# Patient Record
Sex: MALE | Race: WHITE | NOT HISPANIC OR LATINO | Employment: FULL TIME
[De-identification: names, ages, dates, MRNs, and addresses within clinical notes are randomized per-mention and may not be internally consistent; named-entity substitution may affect disease eponyms.]

---

## 2017-03-24 LAB — COLONOSCOPY STUDY: NORMAL

## 2018-01-19 ENCOUNTER — HOSPITAL (OUTPATIENT)
Dept: OTHER | Age: 53
End: 2018-01-19

## 2018-01-19 ENCOUNTER — IMAGING SERVICES (OUTPATIENT)
Dept: OTHER | Age: 53
End: 2018-01-19

## 2019-01-21 ENCOUNTER — TELEPHONE (OUTPATIENT)
Dept: SCHEDULING | Age: 54
End: 2019-01-21

## 2019-03-14 ENCOUNTER — APPOINTMENT (OUTPATIENT)
Dept: DERMATOLOGY | Age: 54
End: 2019-03-14

## 2019-07-12 ENCOUNTER — TELEPHONE (OUTPATIENT)
Dept: SCHEDULING | Age: 54
End: 2019-07-12

## 2019-07-15 ENCOUNTER — OFFICE VISIT (OUTPATIENT)
Dept: FAMILY MEDICINE | Age: 54
End: 2019-07-15

## 2019-07-15 VITALS
TEMPERATURE: 98.2 F | OXYGEN SATURATION: 99 % | BODY MASS INDEX: 29.54 KG/M2 | HEART RATE: 77 BPM | DIASTOLIC BLOOD PRESSURE: 84 MMHG | WEIGHT: 211 LBS | SYSTOLIC BLOOD PRESSURE: 122 MMHG | RESPIRATION RATE: 16 BRPM | HEIGHT: 71 IN

## 2019-07-15 DIAGNOSIS — J30.2 SEASONAL ALLERGIC RHINITIS, UNSPECIFIED TRIGGER: ICD-10-CM

## 2019-07-15 DIAGNOSIS — Z00.00 ANNUAL PHYSICAL EXAM: Primary | ICD-10-CM

## 2019-07-15 PROCEDURE — 99386 PREV VISIT NEW AGE 40-64: CPT | Performed by: FAMILY MEDICINE

## 2019-07-15 RX ORDER — OMEPRAZOLE 40 MG/1
40 CAPSULE, DELAYED RELEASE ORAL DAILY
COMMUNITY
End: 2020-11-12 | Stop reason: ALTCHOICE

## 2019-07-15 RX ORDER — FLUTICASONE PROPIONATE 50 MCG
2 SPRAY, SUSPENSION (ML) NASAL DAILY
Qty: 16 G | Refills: 5 | Status: SHIPPED | OUTPATIENT
Start: 2019-07-15 | End: 2019-08-01 | Stop reason: ALTCHOICE

## 2019-07-15 RX ORDER — METHYLPREDNISOLONE 4 MG
4 TABLET, DOSE PACK ORAL SEE ADMIN INSTRUCTIONS
COMMUNITY
End: 2019-08-01 | Stop reason: ALTCHOICE

## 2019-07-15 ASSESSMENT — ENCOUNTER SYMPTOMS
TROUBLE SWALLOWING: 0
CONSTIPATION: 0
BRUISES/BLEEDS EASILY: 0
HEADACHES: 0
DIZZINESS: 0
SORE THROAT: 0
POLYPHAGIA: 0
DIARRHEA: 0
FATIGUE: 0
NUMBNESS: 0
EYE PAIN: 0
POLYDIPSIA: 0
COUGH: 0
ABDOMINAL PAIN: 0
SHORTNESS OF BREATH: 0

## 2019-07-15 ASSESSMENT — PATIENT HEALTH QUESTIONNAIRE - PHQ9
SUM OF ALL RESPONSES TO PHQ9 QUESTIONS 1 AND 2: 0
1. LITTLE INTEREST OR PLEASURE IN DOING THINGS: NOT AT ALL
2. FEELING DOWN, DEPRESSED OR HOPELESS: NOT AT ALL
SUM OF ALL RESPONSES TO PHQ9 QUESTIONS 1 AND 2: 0

## 2019-07-18 ENCOUNTER — LAB SERVICES (OUTPATIENT)
Dept: LAB | Age: 54
End: 2019-07-18

## 2019-07-18 DIAGNOSIS — Z00.00 ANNUAL PHYSICAL EXAM: ICD-10-CM

## 2019-07-18 LAB
ALBUMIN SERPL-MCNC: 4 G/DL (ref 3.6–5.1)
ALBUMIN/GLOB SERPL: 1.4 {RATIO} (ref 1–2.4)
ALP SERPL-CCNC: 57 UNITS/L (ref 45–117)
ALT SERPL-CCNC: 58 UNITS/L
ANION GAP SERPL CALC-SCNC: 12 MMOL/L (ref 10–20)
AST SERPL-CCNC: 24 UNITS/L
BASOPHILS # BLD AUTO: 0.1 K/MCL (ref 0–0.3)
BASOPHILS NFR BLD AUTO: 1 %
BILIRUB SERPL-MCNC: 0.9 MG/DL (ref 0.2–1)
BUN SERPL-MCNC: 16 MG/DL (ref 6–20)
BUN/CREAT SERPL: 19 (ref 7–25)
CALCIUM SERPL-MCNC: 9.2 MG/DL (ref 8.4–10.2)
CHLORIDE SERPL-SCNC: 105 MMOL/L (ref 98–107)
CHOLEST SERPL-MCNC: 171 MG/DL
CHOLEST/HDLC SERPL: 2.3 {RATIO}
CO2 SERPL-SCNC: 27 MMOL/L (ref 21–32)
CREAT SERPL-MCNC: 0.82 MG/DL (ref 0.67–1.17)
DIFFERENTIAL METHOD BLD: NORMAL
EOSINOPHIL # BLD AUTO: 0.4 K/MCL (ref 0.1–0.5)
EOSINOPHIL NFR SPEC: 5 %
ERYTHROCYTE [DISTWIDTH] IN BLOOD: 12.6 % (ref 11–15)
FASTING STATUS PATIENT QL REPORTED: 12 HRS
GLOBULIN SER-MCNC: 2.8 G/DL (ref 2–4)
GLUCOSE SERPL-MCNC: 85 MG/DL (ref 65–99)
HCT VFR BLD CALC: 44.5 % (ref 39–51)
HDLC SERPL-MCNC: 75 MG/DL
HGB BLD-MCNC: 14.7 G/DL (ref 13–17)
IMM GRANULOCYTES # BLD AUTO: 0 K/MCL (ref 0–0.2)
IMM GRANULOCYTES NFR BLD: 0 %
LDLC SERPL-MCNC: 85 MG/DL
LENGTH OF FAST TIME PATIENT: 12 HRS
LYMPHOCYTES # BLD MANUAL: 2.9 K/MCL (ref 1–4)
LYMPHOCYTES NFR BLD MANUAL: 38 %
MCH RBC QN AUTO: 31.3 PG (ref 26–34)
MCHC RBC AUTO-ENTMCNC: 33 G/DL (ref 32–36.5)
MCV RBC AUTO: 94.9 FL (ref 78–100)
MONOCYTES # BLD MANUAL: 0.9 K/MCL (ref 0.3–0.9)
MONOCYTES NFR BLD MANUAL: 12 %
NEUTROPHILS # BLD: 3.4 K/MCL (ref 1.8–7.7)
NEUTROPHILS NFR BLD AUTO: 44 %
NONHDLC SERPL-MCNC: 96 MG/DL
NRBC BLD MANUAL-RTO: 0 /100 WBC
PLATELET # BLD: 236 K/MCL (ref 140–450)
POTASSIUM SERPL-SCNC: 3.5 MMOL/L (ref 3.4–5.1)
PROT SERPL-MCNC: 6.8 G/DL (ref 6.4–8.2)
PSA SERPL-MCNC: 0.94 NG/ML
RBC # BLD: 4.69 MIL/MCL (ref 4.5–5.9)
SODIUM SERPL-SCNC: 141 MMOL/L (ref 135–145)
TRIGL SERPL-MCNC: 56 MG/DL
TSH SERPL-ACNC: 2.44 MCUNITS/ML (ref 0.35–5)
WBC # BLD: 7.7 K/MCL (ref 4.2–11)

## 2019-07-18 PROCEDURE — 85025 COMPLETE CBC W/AUTO DIFF WBC: CPT | Performed by: FAMILY MEDICINE

## 2019-07-18 PROCEDURE — G0103 PSA SCREENING: HCPCS | Performed by: FAMILY MEDICINE

## 2019-07-18 PROCEDURE — 84443 ASSAY THYROID STIM HORMONE: CPT | Performed by: FAMILY MEDICINE

## 2019-07-18 PROCEDURE — 80053 COMPREHEN METABOLIC PANEL: CPT | Performed by: FAMILY MEDICINE

## 2019-07-18 PROCEDURE — 80061 LIPID PANEL: CPT | Performed by: FAMILY MEDICINE

## 2019-07-18 PROCEDURE — 36415 COLL VENOUS BLD VENIPUNCTURE: CPT

## 2019-07-19 ENCOUNTER — E-ADVICE (OUTPATIENT)
Dept: FAMILY MEDICINE | Age: 54
End: 2019-07-19

## 2019-07-25 ENCOUNTER — TELEPHONE (OUTPATIENT)
Dept: SCHEDULING | Age: 54
End: 2019-07-25

## 2019-07-25 ENCOUNTER — E-ADVICE (OUTPATIENT)
Dept: FAMILY MEDICINE | Age: 54
End: 2019-07-25

## 2019-07-26 ENCOUNTER — TELEPHONE (OUTPATIENT)
Dept: SCHEDULING | Age: 54
End: 2019-07-26

## 2019-07-30 ENCOUNTER — TELEPHONE (OUTPATIENT)
Dept: SCHEDULING | Age: 54
End: 2019-07-30

## 2019-08-01 ENCOUNTER — OFFICE VISIT (OUTPATIENT)
Dept: FAMILY MEDICINE | Age: 54
End: 2019-08-01

## 2019-08-01 VITALS
HEART RATE: 86 BPM | TEMPERATURE: 98.1 F | HEIGHT: 71 IN | SYSTOLIC BLOOD PRESSURE: 102 MMHG | OXYGEN SATURATION: 95 % | WEIGHT: 211.5 LBS | BODY MASS INDEX: 29.61 KG/M2 | DIASTOLIC BLOOD PRESSURE: 70 MMHG | RESPIRATION RATE: 16 BRPM

## 2019-08-01 DIAGNOSIS — J04.0 ACUTE LARYNGITIS: Primary | ICD-10-CM

## 2019-08-01 DIAGNOSIS — R49.0 HOARSENESS OF VOICE: ICD-10-CM

## 2019-08-01 DIAGNOSIS — G47.9 SLEEP DIFFICULTIES: ICD-10-CM

## 2019-08-01 PROCEDURE — 99214 OFFICE O/P EST MOD 30 MIN: CPT | Performed by: FAMILY MEDICINE

## 2019-08-01 RX ORDER — AZITHROMYCIN 250 MG/1
TABLET, FILM COATED ORAL
Qty: 6 TABLET | Refills: 0 | Status: SHIPPED | OUTPATIENT
Start: 2019-08-01 | End: 2020-11-12 | Stop reason: ALTCHOICE

## 2019-08-01 ASSESSMENT — ENCOUNTER SYMPTOMS
EYE PAIN: 0
SORE THROAT: 1
VOICE CHANGE: 1
FATIGUE: 0
HEADACHES: 0
ABDOMINAL PAIN: 0
COUGH: 0
BRUISES/BLEEDS EASILY: 0
CONSTIPATION: 0
DIARRHEA: 0
SHORTNESS OF BREATH: 0
TROUBLE SWALLOWING: 0
DIZZINESS: 0

## 2019-08-01 ASSESSMENT — PATIENT HEALTH QUESTIONNAIRE - PHQ9
1. LITTLE INTEREST OR PLEASURE IN DOING THINGS: NOT AT ALL
SUM OF ALL RESPONSES TO PHQ9 QUESTIONS 1 AND 2: 0
SUM OF ALL RESPONSES TO PHQ9 QUESTIONS 1 AND 2: 0
2. FEELING DOWN, DEPRESSED OR HOPELESS: NOT AT ALL

## 2019-08-02 ENCOUNTER — E-ADVICE (OUTPATIENT)
Dept: FAMILY MEDICINE | Age: 54
End: 2019-08-02

## 2019-08-03 ENCOUNTER — APPOINTMENT (OUTPATIENT)
Dept: FAMILY MEDICINE | Age: 54
End: 2019-08-03

## 2019-08-05 ENCOUNTER — APPOINTMENT (OUTPATIENT)
Dept: FAMILY MEDICINE | Age: 54
End: 2019-08-05

## 2019-08-23 ENCOUNTER — TELEPHONE (OUTPATIENT)
Dept: SCHEDULING | Age: 54
End: 2019-08-23

## 2019-08-23 ENCOUNTER — EXTERNAL RECORD (OUTPATIENT)
Dept: HEALTH INFORMATION MANAGEMENT | Facility: OTHER | Age: 54
End: 2019-08-23

## 2019-08-26 ENCOUNTER — E-ADVICE (OUTPATIENT)
Dept: FAMILY MEDICINE | Age: 54
End: 2019-08-26

## 2019-08-27 ENCOUNTER — HOSPITAL (OUTPATIENT)
Dept: OTHER | Age: 54
End: 2019-08-27
Attending: OTOLARYNGOLOGY

## 2019-08-29 LAB
ALLERGEN CORN IGE (CRN): <0.35
ALLERGEN CORN IGE (CRN): NORMAL
ALLERGEN CORN IGE (CRN): NORMAL
ALLERGEN COW'S MILK IGE (MLK): <0.35
ALLERGEN COW'S MILK IGE (MLK): NORMAL
ALLERGEN COW'S MILK IGE (MLK): NORMAL
ALLERGEN CRAB IGE (CRB): <0.35
ALLERGEN CRAB IGE (CRB): NORMAL
ALLERGEN CRAB IGE (CRB): NORMAL
ALLERGEN EGG WHITE IGE (EGGW): <0.35
ALLERGEN EGG WHITE IGE (EGGW): NORMAL
ALLERGEN EGG WHITE IGE (EGGW): NORMAL
ALLERGEN ORANGE IGE (ORAN): <0.35
ALLERGEN ORANGE IGE (ORAN): NORMAL
ALLERGEN ORANGE IGE (ORAN): NORMAL
ALLERGEN PEANUT IGE (PNUT): <0.35
ALLERGEN PEANUT IGE (PNUT): NORMAL
ALLERGEN PEANUT IGE (PNUT): NORMAL
ALLERGEN SHRIMP IGE (SRMP): <0.35
ALLERGEN SHRIMP IGE (SRMP): NORMAL
ALLERGEN SHRIMP IGE (SRMP): NORMAL
ALLERGEN SOYBEAN IGE (SOY): <0.35
ALLERGEN SOYBEAN IGE (SOY): NORMAL
ALLERGEN SOYBEAN IGE (SOY): NORMAL
ALLERGEN TUNA IGE (TUN): <0.35
ALLERGEN TUNA IGE (TUN): NORMAL
ALLERGEN TUNA IGE (TUN): NORMAL
ALLERGEN WHEAT IGE (WEAT): <0.35
ALLERGEN WHEAT IGE (WEAT): NORMAL
ALLERGEN WHEAT IGE (WEAT): NORMAL
CORN CLASS (CRNCL): 0
CORN CLASS (CRNCL): NORMAL
COW'S MILK CLASS (MLKCL): 0
COW'S MILK CLASS (MLKCL): NORMAL
CRAB CLASS (CRBCL): 0
CRAB CLASS (CRBCL): NORMAL
EGG WHITE CLASS (EGGCL): 0
EGG WHITE CLASS (EGGCL): NORMAL
ORANGE CLASS (ORNCL): 0
ORANGE CLASS (ORNCL): NORMAL
PEANUT CLASS (PNUCL): 0
PEANUT CLASS (PNUCL): NORMAL
SHRIMP CLASS (SRMCL): 0
SHRIMP CLASS (SRMCL): NORMAL
SOYBEAN CLASS (SOYCL): 0
SOYBEAN CLASS (SOYCL): NORMAL
TUNA CLASS (TUNCL): 0
TUNA CLASS (TUNCL): NORMAL
WHEAT CLASS (WEACL): 0
WHEAT CLASS (WEACL): NORMAL

## 2019-09-05 ENCOUNTER — EXTERNAL RECORD (OUTPATIENT)
Dept: HEALTH INFORMATION MANAGEMENT | Facility: OTHER | Age: 54
End: 2019-09-05

## 2019-09-27 ENCOUNTER — EXTERNAL RECORD (OUTPATIENT)
Dept: HEALTH INFORMATION MANAGEMENT | Facility: OTHER | Age: 54
End: 2019-09-27

## 2019-12-10 ENCOUNTER — EXTERNAL RECORD (OUTPATIENT)
Dept: HEALTH INFORMATION MANAGEMENT | Facility: OTHER | Age: 54
End: 2019-12-10

## 2019-12-15 ENCOUNTER — OFFICE VISIT (OUTPATIENT)
Dept: SLEEP MEDICINE | Age: 54
End: 2019-12-15

## 2019-12-15 DIAGNOSIS — G47.33 OSA (OBSTRUCTIVE SLEEP APNEA): ICD-10-CM

## 2019-12-15 PROCEDURE — 95810 POLYSOM 6/> YRS 4/> PARAM: CPT | Performed by: INTERNAL MEDICINE

## 2020-01-01 ENCOUNTER — EXTERNAL RECORD (OUTPATIENT)
Dept: HEALTH INFORMATION MANAGEMENT | Facility: OTHER | Age: 55
End: 2020-01-01

## 2020-01-24 ENCOUNTER — TELEPHONE (OUTPATIENT)
Dept: SCHEDULING | Age: 55
End: 2020-01-24

## 2020-01-30 ENCOUNTER — LAB SERVICES (OUTPATIENT)
Dept: LAB | Age: 55
End: 2020-01-30

## 2020-01-30 DIAGNOSIS — E61.1 IRON DEFICIENCY: Primary | ICD-10-CM

## 2020-01-30 PROCEDURE — 82607 VITAMIN B-12: CPT | Performed by: FAMILY MEDICINE

## 2020-01-30 PROCEDURE — 36415 COLL VENOUS BLD VENIPUNCTURE: CPT | Performed by: FAMILY MEDICINE

## 2020-01-30 PROCEDURE — 82728 ASSAY OF FERRITIN: CPT | Performed by: FAMILY MEDICINE

## 2020-01-30 PROCEDURE — 82747 ASSAY OF FOLIC ACID RBC: CPT | Performed by: FAMILY MEDICINE

## 2020-01-31 LAB
FERRITIN SERPL-MCNC: 75 NG/ML (ref 26–388)
FOLATE RBC-MCNC: 630 NG/ML (ref 280–791)
VIT B12 SERPL-MCNC: 753 PG/ML (ref 211–911)

## 2020-02-05 ENCOUNTER — TELEPHONE (OUTPATIENT)
Dept: SCHEDULING | Age: 55
End: 2020-02-05

## 2020-02-05 ENCOUNTER — EXTERNAL RECORD (OUTPATIENT)
Dept: HEALTH INFORMATION MANAGEMENT | Facility: OTHER | Age: 55
End: 2020-02-05

## 2020-03-16 ENCOUNTER — E-ADVICE (OUTPATIENT)
Dept: FAMILY MEDICINE | Age: 55
End: 2020-03-16

## 2020-11-12 ENCOUNTER — TELEPHONE (OUTPATIENT)
Dept: SCHEDULING | Age: 55
End: 2020-11-12

## 2020-11-12 ENCOUNTER — HOSPITAL ENCOUNTER (OUTPATIENT)
Dept: CT IMAGING | Age: 55
Discharge: HOME | End: 2020-11-12
Attending: FAMILY MEDICINE

## 2020-11-12 ENCOUNTER — V-VISIT (OUTPATIENT)
Dept: FAMILY MEDICINE | Age: 55
End: 2020-11-12

## 2020-11-12 DIAGNOSIS — U07.1 COVID-19 VIRUS INFECTION: Primary | ICD-10-CM

## 2020-11-12 DIAGNOSIS — U07.1 COVID-19 VIRUS INFECTION: ICD-10-CM

## 2020-11-12 DIAGNOSIS — R05.3 PERSISTENT COUGH: ICD-10-CM

## 2020-11-12 PROBLEM — R49.0 HOARSENESS OF VOICE: Status: RESOLVED | Noted: 2019-08-01 | Resolved: 2020-11-12

## 2020-11-12 PROBLEM — J04.0 ACUTE LARYNGITIS: Status: RESOLVED | Noted: 2019-08-01 | Resolved: 2020-11-12

## 2020-11-12 PROBLEM — G47.9 SLEEP DIFFICULTIES: Status: RESOLVED | Noted: 2019-08-01 | Resolved: 2020-11-12

## 2020-11-12 PROCEDURE — 99214 OFFICE O/P EST MOD 30 MIN: CPT | Performed by: FAMILY MEDICINE

## 2020-11-12 PROCEDURE — 71250 CT THORAX DX C-: CPT

## 2020-11-12 RX ORDER — DEXTROMETHORPHAN HYDROBROMIDE AND PROMETHAZINE HYDROCHLORIDE 15; 6.25 MG/5ML; MG/5ML
5 SYRUP ORAL EVERY 4 HOURS PRN
Qty: 473 ML | Refills: 0 | Status: SHIPPED | OUTPATIENT
Start: 2020-11-12 | End: 2020-12-05

## 2020-11-12 RX ORDER — ALBUTEROL SULFATE 90 UG/1
1-2 AEROSOL, METERED RESPIRATORY (INHALATION) EVERY 6 HOURS PRN
COMMUNITY
Start: 2020-11-07 | End: 2021-01-07 | Stop reason: ALTCHOICE

## 2020-11-12 RX ORDER — DEXTROMETHORPHAN HYDROBROMIDE AND PROMETHAZINE HYDROCHLORIDE 15; 6.25 MG/5ML; MG/5ML
5 SYRUP ORAL EVERY 4 HOURS PRN
COMMUNITY
Start: 2020-11-07 | End: 2020-11-12 | Stop reason: SDUPTHER

## 2020-11-12 RX ORDER — PREDNISONE 20 MG/1
40 TABLET ORAL DAILY
Qty: 10 TABLET | Refills: 0 | Status: SHIPPED | OUTPATIENT
Start: 2020-11-12 | End: 2020-11-25 | Stop reason: ALTCHOICE

## 2020-11-12 ASSESSMENT — ENCOUNTER SYMPTOMS
DIARRHEA: 0
SHORTNESS OF BREATH: 0
ABDOMINAL PAIN: 0
COUGH: 1
CONSTIPATION: 0
WHEEZING: 0
FATIGUE: 1
EYE PAIN: 0
TROUBLE SWALLOWING: 0
SORE THROAT: 0
FEVER: 1
CHILLS: 1

## 2020-11-12 ASSESSMENT — PATIENT HEALTH QUESTIONNAIRE - PHQ9
CLINICAL INTERPRETATION OF PHQ9 SCORE: NO FURTHER SCREENING NEEDED
SUM OF ALL RESPONSES TO PHQ9 QUESTIONS 1 AND 2: 0
SUM OF ALL RESPONSES TO PHQ9 QUESTIONS 1 AND 2: 0
CLINICAL INTERPRETATION OF PHQ2 SCORE: NO FURTHER SCREENING NEEDED
2. FEELING DOWN, DEPRESSED OR HOPELESS: NOT AT ALL
1. LITTLE INTEREST OR PLEASURE IN DOING THINGS: NOT AT ALL

## 2020-11-16 ENCOUNTER — OFFICE VISIT (OUTPATIENT)
Dept: FAMILY MEDICINE | Age: 55
End: 2020-11-16

## 2020-11-16 DIAGNOSIS — U07.1 COVID-19 VIRUS INFECTION: Primary | ICD-10-CM

## 2020-11-16 PROCEDURE — 99213 OFFICE O/P EST LOW 20 MIN: CPT | Performed by: FAMILY MEDICINE

## 2020-11-16 ASSESSMENT — ENCOUNTER SYMPTOMS
CHILLS: 0
COUGH: 1
FATIGUE: 1
SORE THROAT: 0
FEVER: 0
DIARRHEA: 0
CONSTIPATION: 0
EYE PAIN: 0
ABDOMINAL PAIN: 0
SHORTNESS OF BREATH: 0
TROUBLE SWALLOWING: 0

## 2020-11-16 ASSESSMENT — PATIENT HEALTH QUESTIONNAIRE - PHQ9
1. LITTLE INTEREST OR PLEASURE IN DOING THINGS: NOT AT ALL
2. FEELING DOWN, DEPRESSED OR HOPELESS: NOT AT ALL
CLINICAL INTERPRETATION OF PHQ9 SCORE: NO FURTHER SCREENING NEEDED
CLINICAL INTERPRETATION OF PHQ2 SCORE: NO FURTHER SCREENING NEEDED
SUM OF ALL RESPONSES TO PHQ9 QUESTIONS 1 AND 2: 0
SUM OF ALL RESPONSES TO PHQ9 QUESTIONS 1 AND 2: 0

## 2020-11-17 ENCOUNTER — TELEPHONE (OUTPATIENT)
Dept: SCHEDULING | Age: 55
End: 2020-11-17

## 2020-11-20 ENCOUNTER — TELEPHONE (OUTPATIENT)
Dept: SCHEDULING | Age: 55
End: 2020-11-20

## 2020-11-24 ENCOUNTER — TELEPHONE (OUTPATIENT)
Dept: SCHEDULING | Age: 55
End: 2020-11-24

## 2020-11-25 ENCOUNTER — V-VISIT (OUTPATIENT)
Dept: FAMILY MEDICINE | Age: 55
End: 2020-11-25

## 2020-11-25 ENCOUNTER — TELEPHONE (OUTPATIENT)
Dept: SCHEDULING | Age: 55
End: 2020-11-25

## 2020-11-25 DIAGNOSIS — R05.3 PERSISTENT COUGH: ICD-10-CM

## 2020-11-25 DIAGNOSIS — U07.1 COVID-19 VIRUS INFECTION: Primary | ICD-10-CM

## 2020-11-25 PROCEDURE — 99213 OFFICE O/P EST LOW 20 MIN: CPT | Performed by: FAMILY MEDICINE

## 2020-11-25 RX ORDER — FLUTICASONE FUROATE AND VILANTEROL 200; 25 UG/1; UG/1
1 POWDER RESPIRATORY (INHALATION) DAILY
Qty: 60 EACH | Refills: 1 | Status: SHIPPED | OUTPATIENT
Start: 2020-11-25 | End: 2021-01-07 | Stop reason: ALTCHOICE

## 2020-11-25 ASSESSMENT — PATIENT HEALTH QUESTIONNAIRE - PHQ9
CLINICAL INTERPRETATION OF PHQ2 SCORE: NO FURTHER SCREENING NEEDED
SUM OF ALL RESPONSES TO PHQ9 QUESTIONS 1 AND 2: 0
CLINICAL INTERPRETATION OF PHQ9 SCORE: NO FURTHER SCREENING NEEDED
SUM OF ALL RESPONSES TO PHQ9 QUESTIONS 1 AND 2: 0
2. FEELING DOWN, DEPRESSED OR HOPELESS: NOT AT ALL
1. LITTLE INTEREST OR PLEASURE IN DOING THINGS: NOT AT ALL

## 2020-11-25 ASSESSMENT — ENCOUNTER SYMPTOMS
SHORTNESS OF BREATH: 0
ABDOMINAL PAIN: 0
DIARRHEA: 0
COUGH: 1
CONSTIPATION: 0
FEVER: 0
SORE THROAT: 0
TROUBLE SWALLOWING: 0
CHILLS: 0
FATIGUE: 1
EYE PAIN: 0

## 2020-12-04 DIAGNOSIS — R05.3 PERSISTENT COUGH: ICD-10-CM

## 2020-12-04 DIAGNOSIS — U07.1 COVID-19 VIRUS INFECTION: ICD-10-CM

## 2020-12-05 RX ORDER — DEXTROMETHORPHAN HYDROBROMIDE AND PROMETHAZINE HYDROCHLORIDE 15; 6.25 MG/5ML; MG/5ML
SYRUP ORAL
Qty: 240 ML | Refills: 1 | Status: SHIPPED | OUTPATIENT
Start: 2020-12-05 | End: 2021-01-07 | Stop reason: ALTCHOICE

## 2020-12-22 ENCOUNTER — TELEPHONE (OUTPATIENT)
Dept: SCHEDULING | Age: 55
End: 2020-12-22

## 2020-12-23 ENCOUNTER — TELEPHONE (OUTPATIENT)
Dept: SCHEDULING | Age: 55
End: 2020-12-23

## 2020-12-28 ENCOUNTER — APPOINTMENT (OUTPATIENT)
Dept: FAMILY MEDICINE | Age: 55
End: 2020-12-28

## 2021-01-01 ENCOUNTER — EXTERNAL RECORD (OUTPATIENT)
Dept: OTHER | Age: 56
End: 2021-01-01

## 2021-01-05 ENCOUNTER — TELEPHONE (OUTPATIENT)
Dept: SCHEDULING | Age: 56
End: 2021-01-05

## 2021-01-07 ENCOUNTER — OFFICE VISIT (OUTPATIENT)
Dept: FAMILY MEDICINE | Age: 56
End: 2021-01-07

## 2021-01-07 DIAGNOSIS — U07.1 COVID-19 VIRUS INFECTION: ICD-10-CM

## 2021-01-07 DIAGNOSIS — Z11.59 NEED FOR HEPATITIS C SCREENING TEST: ICD-10-CM

## 2021-01-07 DIAGNOSIS — Z00.00 ANNUAL PHYSICAL EXAM: Primary | ICD-10-CM

## 2021-01-07 DIAGNOSIS — R05.3 PERSISTENT COUGH: ICD-10-CM

## 2021-01-07 LAB
BASOPHILS # BLD: 0.1 K/MCL (ref 0–0.3)
BASOPHILS NFR BLD: 2 %
DEPRECATED RDW RBC: 45 FL (ref 39–50)
EOSINOPHIL # BLD: 0.4 K/MCL (ref 0–0.5)
EOSINOPHIL NFR BLD: 5 %
ERYTHROCYTE [DISTWIDTH] IN BLOOD: 12.6 % (ref 11–15)
HCT VFR BLD CALC: 46.8 % (ref 39–51)
HGB BLD-MCNC: 15.2 G/DL (ref 13–17)
IMM GRANULOCYTES # BLD AUTO: 0 K/MCL (ref 0–0.2)
IMM GRANULOCYTES # BLD: 0 %
LYMPHOCYTES # BLD: 1.8 K/MCL (ref 1–4)
LYMPHOCYTES NFR BLD: 27 %
MCH RBC QN AUTO: 31 PG (ref 26–34)
MCHC RBC AUTO-ENTMCNC: 32.5 G/DL (ref 32–36.5)
MCV RBC AUTO: 95.5 FL (ref 78–100)
MONOCYTES # BLD: 0.7 K/MCL (ref 0.3–0.9)
MONOCYTES NFR BLD: 10 %
NEUTROPHILS # BLD: 3.7 K/MCL (ref 1.8–7.7)
NEUTROPHILS NFR BLD: 56 %
NRBC BLD MANUAL-RTO: 0 /100 WBC
PLATELET # BLD AUTO: 259 K/MCL (ref 140–450)
RBC # BLD: 4.9 MIL/MCL (ref 4.5–5.9)
WBC # BLD: 6.5 K/MCL (ref 4.2–11)

## 2021-01-07 PROCEDURE — 86901 BLOOD TYPING SEROLOGIC RH(D): CPT | Performed by: FAMILY MEDICINE

## 2021-01-07 PROCEDURE — 86769 SARS-COV-2 COVID-19 ANTIBODY: CPT | Performed by: FAMILY MEDICINE

## 2021-01-07 PROCEDURE — 36415 COLL VENOUS BLD VENIPUNCTURE: CPT | Performed by: FAMILY MEDICINE

## 2021-01-07 PROCEDURE — 80050 GENERAL HEALTH PANEL: CPT | Performed by: FAMILY MEDICINE

## 2021-01-07 PROCEDURE — 86900 BLOOD TYPING SEROLOGIC ABO: CPT | Performed by: FAMILY MEDICINE

## 2021-01-07 PROCEDURE — 80061 LIPID PANEL: CPT | Performed by: FAMILY MEDICINE

## 2021-01-07 PROCEDURE — 86803 HEPATITIS C AB TEST: CPT | Performed by: FAMILY MEDICINE

## 2021-01-07 PROCEDURE — 99396 PREV VISIT EST AGE 40-64: CPT | Performed by: FAMILY MEDICINE

## 2021-01-07 RX ORDER — FLUTICASONE FUROATE AND VILANTEROL 200; 25 UG/1; UG/1
1 POWDER RESPIRATORY (INHALATION) DAILY
Qty: 60 EACH | Refills: 2 | Status: SHIPPED | OUTPATIENT
Start: 2021-01-07 | End: 2021-04-27 | Stop reason: ALTCHOICE

## 2021-01-07 RX ORDER — OMEPRAZOLE 40 MG/1
CAPSULE, DELAYED RELEASE ORAL
COMMUNITY
Start: 2020-12-09 | End: 2023-06-19

## 2021-01-07 RX ORDER — FEXOFENADINE HCL 180 MG/1
180 TABLET ORAL DAILY
COMMUNITY
End: 2021-04-27 | Stop reason: ALTCHOICE

## 2021-01-07 ASSESSMENT — PATIENT HEALTH QUESTIONNAIRE - PHQ9
CLINICAL INTERPRETATION OF PHQ9 SCORE: NO FURTHER SCREENING NEEDED
2. FEELING DOWN, DEPRESSED OR HOPELESS: NOT AT ALL
CLINICAL INTERPRETATION OF PHQ2 SCORE: NO FURTHER SCREENING NEEDED
1. LITTLE INTEREST OR PLEASURE IN DOING THINGS: NOT AT ALL
SUM OF ALL RESPONSES TO PHQ9 QUESTIONS 1 AND 2: 0
SUM OF ALL RESPONSES TO PHQ9 QUESTIONS 1 AND 2: 0

## 2021-01-07 ASSESSMENT — ENCOUNTER SYMPTOMS
HEADACHES: 0
SORE THROAT: 0
DIZZINESS: 0
DIARRHEA: 0
TROUBLE SWALLOWING: 0
COUGH: 0
EYE PAIN: 0
CONSTIPATION: 0
FATIGUE: 0
SHORTNESS OF BREATH: 0
ABDOMINAL PAIN: 0

## 2021-01-07 ASSESSMENT — PAIN SCALES - GENERAL: PAINLEVEL: 0

## 2021-01-08 LAB
ABO + RH BLD: NORMAL
ALBUMIN SERPL-MCNC: 4.3 G/DL (ref 3.6–5.1)
ALBUMIN/GLOB SERPL: 1.2 {RATIO} (ref 1–2.4)
ALP SERPL-CCNC: 58 UNITS/L (ref 45–117)
ALT SERPL-CCNC: 76 UNITS/L
ANION GAP SERPL CALC-SCNC: 12 MMOL/L (ref 10–20)
AST SERPL-CCNC: 40 UNITS/L
BILIRUB SERPL-MCNC: 0.8 MG/DL (ref 0.2–1)
BUN SERPL-MCNC: 14 MG/DL (ref 6–20)
BUN/CREAT SERPL: 16 (ref 7–25)
CALCIUM SERPL-MCNC: 9.7 MG/DL (ref 8.4–10.2)
CHLORIDE SERPL-SCNC: 104 MMOL/L (ref 98–107)
CHOLEST SERPL-MCNC: 213 MG/DL
CHOLEST/HDLC SERPL: 2.7 {RATIO}
CO2 SERPL-SCNC: 29 MMOL/L (ref 21–32)
CREAT SERPL-MCNC: 0.86 MG/DL (ref 0.67–1.17)
FASTING DURATION TIME PATIENT: 12 HOURS
FASTING DURATION TIME PATIENT: ABNORMAL H
GFR SERPLBLD BASED ON 1.73 SQ M-ARVRAT: >90 ML/MIN/1.73M2
GLOBULIN SER-MCNC: 3.5 G/DL (ref 2–4)
GLUCOSE SERPL-MCNC: 92 MG/DL (ref 65–99)
HCV AB SER QL: NEGATIVE
HDLC SERPL-MCNC: 80 MG/DL
LDLC SERPL CALC-MCNC: 119 MG/DL
NONHDLC SERPL-MCNC: 133 MG/DL
POTASSIUM SERPL-SCNC: 4.4 MMOL/L (ref 3.4–5.1)
PROT SERPL-MCNC: 7.8 G/DL (ref 6.4–8.2)
SARS-COV-2 IGG SERPL QL IA: POSITIVE
SARS-COV-2 N IGG SERPL QL IA: 8.99
SODIUM SERPL-SCNC: 141 MMOL/L (ref 135–145)
TRIGL SERPL-MCNC: 68 MG/DL
TSH SERPL-ACNC: 1.17 MCUNITS/ML (ref 0.35–5)

## 2021-03-18 ENCOUNTER — TELEPHONE (OUTPATIENT)
Dept: SCHEDULING | Age: 56
End: 2021-03-18

## 2021-03-20 ENCOUNTER — TELEPHONE (OUTPATIENT)
Dept: SCHEDULING | Age: 56
End: 2021-03-20

## 2021-03-20 ENCOUNTER — TELEPHONE (OUTPATIENT)
Dept: FAMILY MEDICINE | Age: 56
End: 2021-03-20

## 2021-04-27 ENCOUNTER — OFFICE VISIT (OUTPATIENT)
Dept: FAMILY MEDICINE | Age: 56
End: 2021-04-27

## 2021-04-27 DIAGNOSIS — K42.9 UMBILICAL HERNIA WITHOUT OBSTRUCTION AND WITHOUT GANGRENE: Primary | ICD-10-CM

## 2021-04-27 PROBLEM — R05.3 PERSISTENT COUGH: Status: RESOLVED | Noted: 2020-11-12 | Resolved: 2021-04-27

## 2021-04-27 PROBLEM — U07.1 COVID-19 VIRUS INFECTION: Status: RESOLVED | Noted: 2020-11-12 | Resolved: 2021-04-27

## 2021-04-27 PROCEDURE — 99213 OFFICE O/P EST LOW 20 MIN: CPT | Performed by: FAMILY MEDICINE

## 2021-04-27 RX ORDER — HYDROQUINONE 40 MG/G
CREAM TOPICAL
COMMUNITY
Start: 2021-04-22 | End: 2021-04-27 | Stop reason: ALTCHOICE

## 2021-04-27 RX ORDER — CETIRIZINE HYDROCHLORIDE 10 MG/1
10 TABLET ORAL PRN
COMMUNITY

## 2021-04-27 ASSESSMENT — PATIENT HEALTH QUESTIONNAIRE - PHQ9
CLINICAL INTERPRETATION OF PHQ9 SCORE: NO FURTHER SCREENING NEEDED
SUM OF ALL RESPONSES TO PHQ9 QUESTIONS 1 AND 2: 0
CLINICAL INTERPRETATION OF PHQ2 SCORE: NO FURTHER SCREENING NEEDED
SUM OF ALL RESPONSES TO PHQ9 QUESTIONS 1 AND 2: 0
1. LITTLE INTEREST OR PLEASURE IN DOING THINGS: NOT AT ALL
2. FEELING DOWN, DEPRESSED OR HOPELESS: NOT AT ALL

## 2021-04-27 ASSESSMENT — ENCOUNTER SYMPTOMS
ABDOMINAL PAIN: 0
SHORTNESS OF BREATH: 0
FATIGUE: 0
CONSTIPATION: 0
COUGH: 0
DIARRHEA: 0

## 2021-04-27 ASSESSMENT — PAIN SCALES - GENERAL: PAINLEVEL: 0

## 2021-05-26 VITALS
HEIGHT: 71 IN | SYSTOLIC BLOOD PRESSURE: 116 MMHG | BODY MASS INDEX: 31.12 KG/M2 | SYSTOLIC BLOOD PRESSURE: 124 MMHG | HEIGHT: 71 IN | TEMPERATURE: 98.1 F | DIASTOLIC BLOOD PRESSURE: 72 MMHG | OXYGEN SATURATION: 99 % | DIASTOLIC BLOOD PRESSURE: 78 MMHG | HEART RATE: 84 BPM | HEART RATE: 84 BPM | TEMPERATURE: 97.8 F | WEIGHT: 219.5 LBS | WEIGHT: 222.3 LBS | BODY MASS INDEX: 30.73 KG/M2 | OXYGEN SATURATION: 98 %

## 2021-07-20 DIAGNOSIS — U07.1 COVID-19 VIRUS INFECTION: ICD-10-CM

## 2021-07-20 RX ORDER — FLUTICASONE FUROATE AND VILANTEROL TRIFENATATE 200; 25 UG/1; UG/1
POWDER RESPIRATORY (INHALATION)
Qty: 60 EACH | Refills: 2 | Status: SHIPPED | OUTPATIENT
Start: 2021-07-20 | End: 2021-12-18

## 2021-08-10 ENCOUNTER — V-VISIT (OUTPATIENT)
Dept: FAMILY MEDICINE | Age: 56
End: 2021-08-10

## 2021-08-10 DIAGNOSIS — J20.8 ACUTE BRONCHITIS DUE TO OTHER SPECIFIED ORGANISMS: Primary | ICD-10-CM

## 2021-08-10 PROCEDURE — 99213 OFFICE O/P EST LOW 20 MIN: CPT | Performed by: FAMILY MEDICINE

## 2021-08-10 RX ORDER — ALBUTEROL SULFATE 90 UG/1
2 AEROSOL, METERED RESPIRATORY (INHALATION) EVERY 4 HOURS PRN
Qty: 1 EACH | Refills: 3 | Status: SHIPPED | OUTPATIENT
Start: 2021-08-10 | End: 2023-06-19

## 2021-08-10 RX ORDER — AZITHROMYCIN 250 MG/1
TABLET, FILM COATED ORAL
Qty: 6 TABLET | Refills: 0 | Status: SHIPPED | OUTPATIENT
Start: 2021-08-10 | End: 2023-06-19

## 2021-08-10 RX ORDER — PREDNISONE 20 MG/1
40 TABLET ORAL DAILY
Qty: 10 TABLET | Refills: 0 | Status: SHIPPED | OUTPATIENT
Start: 2021-08-10 | End: 2023-06-19

## 2021-08-10 RX ORDER — HYDROQUINONE 40 MG/G
CREAM TOPICAL
COMMUNITY
Start: 2021-05-23

## 2021-08-10 ASSESSMENT — ENCOUNTER SYMPTOMS
SHORTNESS OF BREATH: 0
CONSTIPATION: 0
SINUS PRESSURE: 1
SORE THROAT: 0
EYE PAIN: 0
COUGH: 1
CHILLS: 0
FEVER: 0
TROUBLE SWALLOWING: 0
DIARRHEA: 0
ABDOMINAL PAIN: 0
FATIGUE: 1

## 2021-08-10 ASSESSMENT — PATIENT HEALTH QUESTIONNAIRE - PHQ9
1. LITTLE INTEREST OR PLEASURE IN DOING THINGS: NOT AT ALL
2. FEELING DOWN, DEPRESSED OR HOPELESS: NOT AT ALL
CLINICAL INTERPRETATION OF PHQ9 SCORE: NO FURTHER SCREENING NEEDED
SUM OF ALL RESPONSES TO PHQ9 QUESTIONS 1 AND 2: 0
CLINICAL INTERPRETATION OF PHQ2 SCORE: NO FURTHER SCREENING NEEDED
SUM OF ALL RESPONSES TO PHQ9 QUESTIONS 1 AND 2: 0

## 2021-09-01 ENCOUNTER — V-VISIT (OUTPATIENT)
Dept: FAMILY MEDICINE | Age: 56
End: 2021-09-01

## 2021-09-01 DIAGNOSIS — K21.9 GASTROESOPHAGEAL REFLUX DISEASE WITHOUT ESOPHAGITIS: ICD-10-CM

## 2021-09-01 DIAGNOSIS — R05.3 CHRONIC COUGH: Primary | ICD-10-CM

## 2021-09-01 DIAGNOSIS — R09.82 POST-NASAL DRAINAGE: ICD-10-CM

## 2021-09-01 PROCEDURE — 99213 OFFICE O/P EST LOW 20 MIN: CPT | Performed by: FAMILY MEDICINE

## 2021-09-01 RX ORDER — FLUTICASONE PROPIONATE 50 MCG
2 SPRAY, SUSPENSION (ML) NASAL DAILY
Qty: 16 G | Refills: 12 | Status: SHIPPED | OUTPATIENT
Start: 2021-09-01 | End: 2023-06-22

## 2021-09-01 ASSESSMENT — ENCOUNTER SYMPTOMS
SHORTNESS OF BREATH: 0
HEADACHES: 0
SORE THROAT: 0
CHILLS: 0
ABDOMINAL PAIN: 0
DIARRHEA: 0
COUGH: 1
FEVER: 0
LIGHT-HEADEDNESS: 0
SINUS PAIN: 0
ROS GI COMMENTS: GERD
VOMITING: 0
NAUSEA: 0
EYES NEGATIVE: 1
PSYCHIATRIC NEGATIVE: 1
SINUS PRESSURE: 0

## 2021-09-01 ASSESSMENT — PATIENT HEALTH QUESTIONNAIRE - PHQ9
SUM OF ALL RESPONSES TO PHQ9 QUESTIONS 1 AND 2: 0
SUM OF ALL RESPONSES TO PHQ9 QUESTIONS 1 AND 2: 0
CLINICAL INTERPRETATION OF PHQ9 SCORE: NO FURTHER SCREENING NEEDED
CLINICAL INTERPRETATION OF PHQ2 SCORE: NO FURTHER SCREENING NEEDED
1. LITTLE INTEREST OR PLEASURE IN DOING THINGS: NOT AT ALL
2. FEELING DOWN, DEPRESSED OR HOPELESS: NOT AT ALL

## 2021-12-17 DIAGNOSIS — U07.1 COVID-19 VIRUS INFECTION: ICD-10-CM

## 2021-12-18 RX ORDER — FLUTICASONE FUROATE AND VILANTEROL TRIFENATATE 200; 25 UG/1; UG/1
POWDER RESPIRATORY (INHALATION)
Qty: 60 EACH | Refills: 2 | Status: SHIPPED | OUTPATIENT
Start: 2021-12-18 | End: 2023-06-19

## 2022-02-03 ENCOUNTER — EXTERNAL RECORD (OUTPATIENT)
Dept: HEALTH INFORMATION MANAGEMENT | Facility: OTHER | Age: 57
End: 2022-02-03

## 2022-05-25 ENCOUNTER — RX ONLY (RX ONLY)
Age: 57
End: 2022-05-25

## 2022-05-25 RX ORDER — HYDROQUINONE 4 %
CREAM (GRAM) TOPICAL
Qty: 30 | Refills: 0 | Status: CANCELLED
Stop reason: CLARIF

## 2022-06-21 ENCOUNTER — APPOINTMENT (OUTPATIENT)
Dept: URBAN - METROPOLITAN AREA CLINIC 245 | Age: 57
Setting detail: DERMATOLOGY
End: 2022-06-22

## 2022-06-21 DIAGNOSIS — L57.8 OTHER SKIN CHANGES DUE TO CHRONIC EXPOSURE TO NONIONIZING RADIATION: ICD-10-CM

## 2022-06-21 DIAGNOSIS — L73.8 OTHER SPECIFIED FOLLICULAR DISORDERS: ICD-10-CM

## 2022-06-21 PROCEDURE — OTHER BENIGN DESTRUCTION COSMETIC: OTHER

## 2022-06-21 PROCEDURE — 99213 OFFICE O/P EST LOW 20 MIN: CPT

## 2022-06-21 PROCEDURE — OTHER PRESCRIPTION: OTHER

## 2022-06-21 PROCEDURE — OTHER COUNSELING: OTHER

## 2022-06-21 RX ORDER — TRETIONIN 0.25 MG/G
CREAM TOPICAL
Qty: 20 | Refills: 2 | Status: ERX | COMMUNITY
Start: 2022-06-21

## 2022-06-21 RX ORDER — HYDROQUINONE 4 %
CREAM (GRAM) TOPICAL BID
Qty: 30 | Refills: 2 | Status: ERX | COMMUNITY
Start: 2022-06-21

## 2022-06-21 ASSESSMENT — LOCATION SIMPLE DESCRIPTION DERM
LOCATION SIMPLE: RIGHT NOSE
LOCATION SIMPLE: RIGHT CHEEK
LOCATION SIMPLE: LEFT CHEEK
LOCATION SIMPLE: LEFT FOREHEAD

## 2022-06-21 ASSESSMENT — LOCATION DETAILED DESCRIPTION DERM
LOCATION DETAILED: RIGHT SUPERIOR LATERAL BUCCAL CHEEK
LOCATION DETAILED: LEFT MEDIAL MALAR CHEEK
LOCATION DETAILED: LEFT FOREHEAD
LOCATION DETAILED: RIGHT NASAL ALA

## 2022-06-21 ASSESSMENT — LOCATION ZONE DERM
LOCATION ZONE: NOSE
LOCATION ZONE: FACE

## 2022-06-21 NOTE — PROCEDURE: BENIGN DESTRUCTION COSMETIC
Price (Use Numbers Only, No Special Characters Or $): 250.00
Consent: The patient's consent was obtained including but not limited to risks of crusting, scabbing, blistering, scarring, darker or lighter pigmentary change, recurrence, incomplete removal and infection.
Anesthesia Type: 1% Xylocaine with 1:200,000 epinephrine
Post-Care Instructions: I reviewed with the patient in detail post-care instructions. Patient is to wear sunprotection, and avoid picking at any of the treated lesions. Pt may apply Vaseline to crusted or scabbing areas.
Detail Level: Detailed

## 2022-08-15 DIAGNOSIS — Z01.812 PRE-PROCEDURAL LABORATORY EXAMINATION: ICD-10-CM

## 2022-08-15 DIAGNOSIS — Z01.818 PREOP EXAMINATION: Primary | ICD-10-CM

## 2022-08-15 DIAGNOSIS — M79.609 LIMB PAIN: ICD-10-CM

## 2022-08-17 ENCOUNTER — LAB SERVICES (OUTPATIENT)
Dept: LAB | Age: 57
End: 2022-08-17

## 2022-08-17 ENCOUNTER — ANCILLARY PROCEDURE (OUTPATIENT)
Dept: CARDIOLOGY | Age: 57
End: 2022-08-17
Attending: PLASTIC SURGERY

## 2022-08-17 DIAGNOSIS — M79.609 LIMB PAIN: ICD-10-CM

## 2022-08-17 DIAGNOSIS — Z01.818 ENCOUNTER FOR OTHER PREPROCEDURAL EXAMINATION: Primary | ICD-10-CM

## 2022-08-17 DIAGNOSIS — Z01.812 PRE-PROCEDURAL LABORATORY EXAMINATION: ICD-10-CM

## 2022-08-17 DIAGNOSIS — Z01.812 ENCOUNTER FOR PREPROCEDURAL LABORATORY EXAMINATION: ICD-10-CM

## 2022-08-17 DIAGNOSIS — Z01.818 PREOP EXAMINATION: ICD-10-CM

## 2022-08-17 PROCEDURE — 36415 COLL VENOUS BLD VENIPUNCTURE: CPT | Performed by: INTERNAL MEDICINE

## 2022-08-17 PROCEDURE — 93000 ELECTROCARDIOGRAM COMPLETE: CPT | Performed by: INTERNAL MEDICINE

## 2022-08-17 PROCEDURE — 80048 BASIC METABOLIC PNL TOTAL CA: CPT | Performed by: INTERNAL MEDICINE

## 2022-08-18 LAB
ANION GAP SERPL CALC-SCNC: 10 MMOL/L (ref 7–19)
BUN SERPL-MCNC: 18 MG/DL (ref 6–20)
BUN/CREAT SERPL: 23 (ref 7–25)
CALCIUM SERPL-MCNC: 9.3 MG/DL (ref 8.4–10.2)
CHLORIDE SERPL-SCNC: 106 MMOL/L (ref 97–110)
CO2 SERPL-SCNC: 27 MMOL/L (ref 21–32)
CREAT SERPL-MCNC: 0.8 MG/DL (ref 0.67–1.17)
FASTING DURATION TIME PATIENT: NORMAL H
GFR SERPLBLD BASED ON 1.73 SQ M-ARVRAT: >90 ML/MIN
GLUCOSE SERPL-MCNC: 92 MG/DL (ref 70–99)
POTASSIUM SERPL-SCNC: 4.3 MMOL/L (ref 3.4–5.1)
SODIUM SERPL-SCNC: 139 MMOL/L (ref 135–145)

## 2022-09-13 ENCOUNTER — APPOINTMENT (OUTPATIENT)
Dept: FAMILY MEDICINE | Age: 57
End: 2022-09-13

## 2022-10-07 ENCOUNTER — V-VISIT (OUTPATIENT)
Dept: FAMILY MEDICINE | Age: 57
End: 2022-10-07

## 2022-10-07 DIAGNOSIS — Z12.5 PROSTATE CANCER SCREENING: ICD-10-CM

## 2022-10-07 DIAGNOSIS — E78.5 DYSLIPIDEMIA: Primary | ICD-10-CM

## 2022-10-07 PROBLEM — J20.8 ACUTE BRONCHITIS DUE TO OTHER SPECIFIED ORGANISMS: Status: RESOLVED | Noted: 2021-08-10 | Resolved: 2022-10-07

## 2022-10-07 PROCEDURE — 99213 OFFICE O/P EST LOW 20 MIN: CPT | Performed by: FAMILY MEDICINE

## 2022-10-07 ASSESSMENT — ENCOUNTER SYMPTOMS
FATIGUE: 0
CONSTIPATION: 0
ABDOMINAL PAIN: 0
COUGH: 0
DIARRHEA: 0
SHORTNESS OF BREATH: 0

## 2022-10-07 ASSESSMENT — PATIENT HEALTH QUESTIONNAIRE - PHQ9
CLINICAL INTERPRETATION OF PHQ2 SCORE: NO FURTHER SCREENING NEEDED
2. FEELING DOWN, DEPRESSED OR HOPELESS: NOT AT ALL
SUM OF ALL RESPONSES TO PHQ9 QUESTIONS 1 AND 2: 0
1. LITTLE INTEREST OR PLEASURE IN DOING THINGS: NOT AT ALL
SUM OF ALL RESPONSES TO PHQ9 QUESTIONS 1 AND 2: 0

## 2022-10-10 ENCOUNTER — EXTERNAL RECORD (OUTPATIENT)
Dept: HEALTH INFORMATION MANAGEMENT | Facility: OTHER | Age: 57
End: 2022-10-10

## 2022-10-18 ENCOUNTER — LAB SERVICES (OUTPATIENT)
Dept: LAB | Age: 57
End: 2022-10-18

## 2022-10-18 DIAGNOSIS — E78.5 DYSLIPIDEMIA: ICD-10-CM

## 2022-10-18 DIAGNOSIS — Z12.5 PROSTATE CANCER SCREENING: ICD-10-CM

## 2022-10-18 LAB
ABO + RH BLD: NORMAL
ALBUMIN SERPL-MCNC: 3.9 G/DL (ref 3.6–5.1)
ALBUMIN/GLOB SERPL: 1.2 {RATIO} (ref 1–2.4)
ALP SERPL-CCNC: 55 UNITS/L (ref 45–117)
ALT SERPL-CCNC: 49 UNITS/L
ANION GAP SERPL CALC-SCNC: 10 MMOL/L (ref 7–19)
AST SERPL-CCNC: 29 UNITS/L
BASOPHILS # BLD: 0.1 K/MCL (ref 0–0.3)
BASOPHILS NFR BLD: 1 %
BILIRUB SERPL-MCNC: 0.9 MG/DL (ref 0.2–1)
BUN SERPL-MCNC: 17 MG/DL (ref 6–20)
BUN/CREAT SERPL: 22 (ref 7–25)
CALCIUM SERPL-MCNC: 9.1 MG/DL (ref 8.4–10.2)
CHLORIDE SERPL-SCNC: 105 MMOL/L (ref 97–110)
CHOLEST SERPL-MCNC: 181 MG/DL
CHOLEST/HDLC SERPL: 2.6 {RATIO}
CO2 SERPL-SCNC: 27 MMOL/L (ref 21–32)
CREAT SERPL-MCNC: 0.76 MG/DL (ref 0.67–1.17)
DEPRECATED RDW RBC: 44.2 FL (ref 39–50)
EOSINOPHIL # BLD: 0.4 K/MCL (ref 0–0.5)
EOSINOPHIL NFR BLD: 6 %
ERYTHROCYTE [DISTWIDTH] IN BLOOD: 12.4 % (ref 11–15)
FASTING DURATION TIME PATIENT: 10 HOURS (ref 0–999)
FASTING DURATION TIME PATIENT: NORMAL H
GFR SERPLBLD BASED ON 1.73 SQ M-ARVRAT: >90 ML/MIN
GLOBULIN SER-MCNC: 3.3 G/DL (ref 2–4)
GLUCOSE SERPL-MCNC: 96 MG/DL (ref 70–99)
HCT VFR BLD CALC: 43.9 % (ref 39–51)
HDLC SERPL-MCNC: 70 MG/DL
HGB BLD-MCNC: 14.5 G/DL (ref 13–17)
IMM GRANULOCYTES # BLD AUTO: 0 K/MCL (ref 0–0.2)
IMM GRANULOCYTES # BLD: 0 %
LDLC SERPL CALC-MCNC: 100 MG/DL
LYMPHOCYTES # BLD: 2.1 K/MCL (ref 1–4)
LYMPHOCYTES NFR BLD: 32 %
MCH RBC QN AUTO: 31.9 PG (ref 26–34)
MCHC RBC AUTO-ENTMCNC: 33 G/DL (ref 32–36.5)
MCV RBC AUTO: 96.5 FL (ref 78–100)
MONOCYTES # BLD: 0.8 K/MCL (ref 0.3–0.9)
MONOCYTES NFR BLD: 12 %
NEUTROPHILS # BLD: 3.2 K/MCL (ref 1.8–7.7)
NEUTROPHILS NFR BLD: 49 %
NONHDLC SERPL-MCNC: 111 MG/DL
NRBC BLD MANUAL-RTO: 0 /100 WBC
PLATELET # BLD AUTO: 245 K/MCL (ref 140–450)
POTASSIUM SERPL-SCNC: 4 MMOL/L (ref 3.4–5.1)
PROT SERPL-MCNC: 7.2 G/DL (ref 6.4–8.2)
PSA SERPL-MCNC: 1.02 NG/ML
RBC # BLD: 4.55 MIL/MCL (ref 4.5–5.9)
SODIUM SERPL-SCNC: 138 MMOL/L (ref 135–145)
TRIGL SERPL-MCNC: 55 MG/DL
TSH SERPL-ACNC: 2.2 MCUNITS/ML (ref 0.35–5)
WBC # BLD: 6.6 K/MCL (ref 4.2–11)

## 2022-10-18 PROCEDURE — 36415 COLL VENOUS BLD VENIPUNCTURE: CPT | Performed by: INTERNAL MEDICINE

## 2022-10-18 PROCEDURE — 80061 LIPID PANEL: CPT | Performed by: INTERNAL MEDICINE

## 2022-10-18 PROCEDURE — 84153 ASSAY OF PSA TOTAL: CPT | Performed by: INTERNAL MEDICINE

## 2022-10-18 PROCEDURE — 80050 GENERAL HEALTH PANEL: CPT | Performed by: INTERNAL MEDICINE

## 2023-01-30 ENCOUNTER — EXTERNAL RECORD (OUTPATIENT)
Dept: HEALTH INFORMATION MANAGEMENT | Facility: OTHER | Age: 58
End: 2023-01-30

## 2023-04-20 ENCOUNTER — EXTERNAL RECORD (OUTPATIENT)
Dept: HEALTH INFORMATION MANAGEMENT | Facility: OTHER | Age: 58
End: 2023-04-20

## 2023-05-04 ENCOUNTER — RX ONLY (RX ONLY)
Age: 58
End: 2023-05-04

## 2023-05-04 RX ORDER — HYDROQUINONE 4 %
CREAM (GRAM) TOPICAL BID
Qty: 30 | Refills: 1 | Status: ERX

## 2023-06-22 RX ORDER — FLUTICASONE PROPIONATE 50 MCG
SPRAY, SUSPENSION (ML) NASAL
Qty: 16 G | Refills: 12 | Status: SHIPPED | OUTPATIENT
Start: 2023-06-22

## 2023-08-22 ENCOUNTER — APPOINTMENT (OUTPATIENT)
Dept: URBAN - METROPOLITAN AREA CLINIC 245 | Age: 58
Setting detail: DERMATOLOGY
End: 2023-08-22

## 2023-08-22 DIAGNOSIS — L57.8 OTHER SKIN CHANGES DUE TO CHRONIC EXPOSURE TO NONIONIZING RADIATION: ICD-10-CM

## 2023-08-22 DIAGNOSIS — I78.8 OTHER DISEASES OF CAPILLARIES: ICD-10-CM

## 2023-08-22 DIAGNOSIS — L57.0 ACTINIC KERATOSIS: ICD-10-CM

## 2023-08-22 DIAGNOSIS — L73.8 OTHER SPECIFIED FOLLICULAR DISORDERS: ICD-10-CM

## 2023-08-22 PROCEDURE — 17003 DESTRUCT PREMALG LES 2-14: CPT

## 2023-08-22 PROCEDURE — OTHER PRESCRIPTION MEDICATION MANAGEMENT: OTHER

## 2023-08-22 PROCEDURE — OTHER LIQUID NITROGEN: OTHER

## 2023-08-22 PROCEDURE — 99213 OFFICE O/P EST LOW 20 MIN: CPT | Mod: 25

## 2023-08-22 PROCEDURE — OTHER ADDITIONAL NOTES: OTHER

## 2023-08-22 PROCEDURE — OTHER COUNSELING: OTHER

## 2023-08-22 PROCEDURE — OTHER MIPS QUALITY: OTHER

## 2023-08-22 PROCEDURE — 17000 DESTRUCT PREMALG LESION: CPT

## 2023-08-22 ASSESSMENT — LOCATION SIMPLE DESCRIPTION DERM
LOCATION SIMPLE: INFERIOR FOREHEAD
LOCATION SIMPLE: RIGHT CHEEK
LOCATION SIMPLE: LEFT NOSE
LOCATION SIMPLE: LEFT CHEEK

## 2023-08-22 ASSESSMENT — LOCATION ZONE DERM
LOCATION ZONE: NOSE
LOCATION ZONE: FACE

## 2023-08-22 ASSESSMENT — LOCATION DETAILED DESCRIPTION DERM
LOCATION DETAILED: LEFT NASAL SIDEWALL
LOCATION DETAILED: INFERIOR MID FOREHEAD
LOCATION DETAILED: LEFT SUPERIOR CENTRAL MALAR CHEEK
LOCATION DETAILED: RIGHT SUPERIOR CENTRAL MALAR CHEEK
LOCATION DETAILED: RIGHT LATERAL MALAR CHEEK
LOCATION DETAILED: RIGHT INFERIOR LATERAL MALAR CHEEK

## 2023-08-22 NOTE — PROCEDURE: PRESCRIPTION MEDICATION MANAGEMENT
Render In Strict Bullet Format?: No
Detail Level: Zone
Continue Regimen: - Tretinoin 0.25% cream ~ apply to face, once nightly

## 2023-08-22 NOTE — PROCEDURE: ADDITIONAL NOTES
Detail Level: Simple
Additional Notes: - Referred to ’s office for laser
Render Risk Assessment In Note?: no

## 2023-08-22 NOTE — PROCEDURE: LIQUID NITROGEN
Show Aperture Variable?: Yes
Detail Level: Detailed
Post-Care Instructions: I reviewed with the patient in detail post-care instructions. Patient is to wear sunprotection, and avoid picking at any of the treated lesions. Pt may apply Vaseline to crusted or scabbing areas.
Duration Of Freeze Thaw-Cycle (Seconds): 3
Render Post-Care Instructions In Note?: no
Number Of Freeze-Thaw Cycles: 1 freeze-thaw cycle
Consent: The patient's consent was obtained including but not limited to risks of crusting, scabbing, blistering, scarring, darker or lighter pigmentary change, recurrence, incomplete removal and infection.
Application Tool (Optional): Liquid Nitrogen Sprayer

## 2024-06-22 ENCOUNTER — APPOINTMENT (OUTPATIENT)
Dept: URBAN - METROPOLITAN AREA CLINIC 245 | Age: 59
Setting detail: DERMATOLOGY
End: 2024-06-22

## 2024-06-22 DIAGNOSIS — L57.0 ACTINIC KERATOSIS: ICD-10-CM

## 2024-06-22 DIAGNOSIS — L57.8 OTHER SKIN CHANGES DUE TO CHRONIC EXPOSURE TO NONIONIZING RADIATION: ICD-10-CM

## 2024-06-22 DIAGNOSIS — L73.8 OTHER SPECIFIED FOLLICULAR DISORDERS: ICD-10-CM

## 2024-06-22 PROCEDURE — OTHER COUNSELING: OTHER

## 2024-06-22 PROCEDURE — 99213 OFFICE O/P EST LOW 20 MIN: CPT | Mod: 25

## 2024-06-22 PROCEDURE — OTHER PRESCRIPTION: OTHER

## 2024-06-22 PROCEDURE — OTHER PRESCRIPTION MEDICATION MANAGEMENT: OTHER

## 2024-06-22 PROCEDURE — 17003 DESTRUCT PREMALG LES 2-14: CPT

## 2024-06-22 PROCEDURE — OTHER MIPS QUALITY: OTHER

## 2024-06-22 PROCEDURE — OTHER LIQUID NITROGEN: OTHER

## 2024-06-22 PROCEDURE — 17000 DESTRUCT PREMALG LESION: CPT

## 2024-06-22 RX ORDER — TRETIONIN 0.25 MG/G
CREAM TOPICAL QHS
Qty: 20 | Refills: 5 | Status: ERX | COMMUNITY
Start: 2024-06-22

## 2024-06-22 ASSESSMENT — LOCATION SIMPLE DESCRIPTION DERM
LOCATION SIMPLE: RIGHT EYEBROW
LOCATION SIMPLE: RIGHT CHEEK
LOCATION SIMPLE: LEFT ZYGOMA
LOCATION SIMPLE: LEFT TEMPLE
LOCATION SIMPLE: RIGHT FOREHEAD
LOCATION SIMPLE: LEFT FOREHEAD
LOCATION SIMPLE: LEFT CHEEK

## 2024-06-22 ASSESSMENT — LOCATION DETAILED DESCRIPTION DERM
LOCATION DETAILED: LEFT CENTRAL MALAR CHEEK
LOCATION DETAILED: RIGHT SUPERIOR FOREHEAD
LOCATION DETAILED: RIGHT SUPERIOR MEDIAL FOREHEAD
LOCATION DETAILED: LEFT MEDIAL ZYGOMA
LOCATION DETAILED: LEFT MEDIAL FOREHEAD
LOCATION DETAILED: LEFT SUPERIOR LATERAL FOREHEAD
LOCATION DETAILED: LEFT MID TEMPLE
LOCATION DETAILED: RIGHT LATERAL FOREHEAD
LOCATION DETAILED: RIGHT INFERIOR CENTRAL MALAR CHEEK
LOCATION DETAILED: RIGHT CENTRAL EYEBROW
LOCATION DETAILED: LEFT SUPERIOR FOREHEAD

## 2024-06-22 ASSESSMENT — LOCATION ZONE DERM: LOCATION ZONE: FACE

## 2024-06-22 NOTE — PROCEDURE: LIQUID NITROGEN
Consent: The patient's consent was obtained including but not limited to risks of crusting, scabbing, blistering, scarring, darker or lighter pigmentary change, recurrence, incomplete removal and infection.
Number Of Freeze-Thaw Cycles: 2 freeze-thaw cycles
Duration Of Freeze Thaw-Cycle (Seconds): 5
Post-Care Instructions: I reviewed with the patient in detail post-care instructions. Patient is to wear sunprotection, and avoid picking at any of the treated lesions. Pt may apply Vaseline to crusted or scabbing areas.
Render Note In Bullet Format When Appropriate: No
Show Aperture Variable?: Yes
Detail Level: Detailed

## 2024-06-22 NOTE — PROCEDURE: PRESCRIPTION MEDICATION MANAGEMENT
Render In Strict Bullet Format?: No
Detail Level: Zone
Continue Regimen: Tretinoin 0.025% cream; a pea-sized drop applied to the face QHS

## 2024-07-03 ENCOUNTER — RX ONLY (RX ONLY)
Age: 59
End: 2024-07-03

## 2024-07-03 RX ORDER — TRETIONIN 0.25 MG/G
CREAM TOPICAL QHS
Qty: 20 | Refills: 5 | Status: ERX

## 2024-08-22 ENCOUNTER — RX ONLY (RX ONLY)
Age: 59
End: 2024-08-22

## 2024-08-22 RX ORDER — HYDROQUINONE 40 MG/G
CREAM TOPICAL BID
Qty: 28.35 | Refills: 2 | Status: ERX | COMMUNITY
Start: 2024-08-22

## 2024-09-11 ENCOUNTER — APPOINTMENT (OUTPATIENT)
Dept: URBAN - METROPOLITAN AREA CLINIC 245 | Age: 59
Setting detail: DERMATOLOGY
End: 2024-09-11

## 2024-09-11 DIAGNOSIS — T78.3XX: ICD-10-CM

## 2024-09-11 PROBLEM — T78.3XXA ANGIONEUROTIC EDEMA, INITIAL ENCOUNTER: Status: ACTIVE | Noted: 2024-09-11

## 2024-09-11 PROCEDURE — OTHER COUNSELING: OTHER

## 2024-09-11 PROCEDURE — OTHER OTC TREATMENT REGIMEN: OTHER

## 2024-09-11 PROCEDURE — 99213 OFFICE O/P EST LOW 20 MIN: CPT

## 2024-09-11 PROCEDURE — OTHER MIPS QUALITY: OTHER

## 2024-09-11 PROCEDURE — OTHER ADDITIONAL NOTES: OTHER

## 2024-09-11 ASSESSMENT — LOCATION DETAILED DESCRIPTION DERM
LOCATION DETAILED: RIGHT INFERIOR CENTRAL MALAR CHEEK
LOCATION DETAILED: LEFT CENTRAL MALAR CHEEK

## 2024-09-11 ASSESSMENT — LOCATION ZONE DERM: LOCATION ZONE: FACE

## 2024-09-11 ASSESSMENT — LOCATION SIMPLE DESCRIPTION DERM
LOCATION SIMPLE: RIGHT CHEEK
LOCATION SIMPLE: LEFT CHEEK

## 2024-09-11 NOTE — PROCEDURE: OTC TREATMENT REGIMEN
Detail Level: Zone
Patient Specific Otc Recommendations (Will Not Stick From Patient To Patient): May begin treatment with antihistamines; fexofenadine in AM and cetirizine in PM

## 2024-09-11 NOTE — PROCEDURE: ADDITIONAL NOTES
Additional Notes: Patient admitted he is on olmesartan.\\nDiscussed with patient this class of drug can cause angioedema.\\nPatient was advised to discuss with his PCP/cardiologist about considering trialing off olmesartan or switching to a different blood pressure medication.
Render Risk Assessment In Note?: no
Detail Level: Simple

## 2024-11-25 DIAGNOSIS — I65.29 CAROTID STENOSIS: ICD-10-CM

## 2024-11-25 DIAGNOSIS — I63.9 CVA (CEREBRAL VASCULAR ACCIDENT)  (CMD): Primary | ICD-10-CM

## 2024-12-13 ENCOUNTER — APPOINTMENT (OUTPATIENT)
Dept: ULTRASOUND IMAGING | Age: 59
End: 2024-12-13
Attending: PSYCHIATRY & NEUROLOGY

## 2024-12-13 ENCOUNTER — APPOINTMENT (OUTPATIENT)
Dept: MRI IMAGING | Age: 59
End: 2024-12-13
Attending: PSYCHIATRY & NEUROLOGY

## 2024-12-13 DIAGNOSIS — I65.29 CAROTID STENOSIS: ICD-10-CM

## 2024-12-13 DIAGNOSIS — I63.9 CVA (CEREBRAL VASCULAR ACCIDENT)  (CMD): ICD-10-CM

## 2024-12-13 PROCEDURE — 93880 EXTRACRANIAL BILAT STUDY: CPT | Performed by: RADIOLOGY

## 2024-12-13 PROCEDURE — 70551 MRI BRAIN STEM W/O DYE: CPT | Performed by: RADIOLOGY

## 2025-06-05 ENCOUNTER — APPOINTMENT (OUTPATIENT)
Dept: URBAN - METROPOLITAN AREA CLINIC 245 | Age: 60
Setting detail: DERMATOLOGY
End: 2025-06-05

## 2025-06-05 DIAGNOSIS — I78.8 OTHER DISEASES OF CAPILLARIES: ICD-10-CM

## 2025-06-05 DIAGNOSIS — L73.8 OTHER SPECIFIED FOLLICULAR DISORDERS: ICD-10-CM

## 2025-06-05 DIAGNOSIS — L57.0 ACTINIC KERATOSIS: ICD-10-CM

## 2025-06-05 DIAGNOSIS — L57.8 OTHER SKIN CHANGES DUE TO CHRONIC EXPOSURE TO NONIONIZING RADIATION: ICD-10-CM

## 2025-06-05 PROCEDURE — OTHER PRESCRIPTION MEDICATION MANAGEMENT: OTHER

## 2025-06-05 PROCEDURE — 99213 OFFICE O/P EST LOW 20 MIN: CPT | Mod: 25

## 2025-06-05 PROCEDURE — OTHER MEDICATION COUNSELING: OTHER

## 2025-06-05 PROCEDURE — OTHER MIPS QUALITY: OTHER

## 2025-06-05 PROCEDURE — OTHER LIQUID NITROGEN: OTHER

## 2025-06-05 PROCEDURE — OTHER BENIGN DESTRUCTION COSMETIC: OTHER

## 2025-06-05 PROCEDURE — OTHER COUNSELING: OTHER

## 2025-06-05 PROCEDURE — 17000 DESTRUCT PREMALG LESION: CPT

## 2025-06-05 PROCEDURE — OTHER PRESCRIPTION: OTHER

## 2025-06-05 RX ORDER — TRETIONIN 0.25 MG/G
CREAM TOPICAL
Qty: 20 | Refills: 5 | Status: ERX

## 2025-06-05 ASSESSMENT — LOCATION DETAILED DESCRIPTION DERM
LOCATION DETAILED: RIGHT INFERIOR CENTRAL MALAR CHEEK
LOCATION DETAILED: RIGHT CENTRAL MALAR CHEEK
LOCATION DETAILED: RIGHT SUPERIOR MEDIAL MALAR CHEEK
LOCATION DETAILED: NASAL SUPRATIP
LOCATION DETAILED: RIGHT FOREHEAD
LOCATION DETAILED: LEFT SUPERIOR CENTRAL MALAR CHEEK
LOCATION DETAILED: LEFT CENTRAL MALAR CHEEK
LOCATION DETAILED: LEFT INFERIOR CENTRAL MALAR CHEEK

## 2025-06-05 ASSESSMENT — LOCATION SIMPLE DESCRIPTION DERM
LOCATION SIMPLE: RIGHT FOREHEAD
LOCATION SIMPLE: LEFT CHEEK
LOCATION SIMPLE: RIGHT CHEEK
LOCATION SIMPLE: NOSE

## 2025-06-05 ASSESSMENT — LOCATION ZONE DERM
LOCATION ZONE: NOSE
LOCATION ZONE: FACE